# Patient Record
Sex: MALE | Race: WHITE | NOT HISPANIC OR LATINO | Employment: FULL TIME | URBAN - METROPOLITAN AREA
[De-identification: names, ages, dates, MRNs, and addresses within clinical notes are randomized per-mention and may not be internally consistent; named-entity substitution may affect disease eponyms.]

---

## 2020-03-09 ENCOUNTER — OFFICE VISIT (OUTPATIENT)
Dept: FAMILY MEDICINE | Facility: CLINIC | Age: 30
End: 2020-03-09
Payer: COMMERCIAL

## 2020-03-09 VITALS
OXYGEN SATURATION: 96 % | DIASTOLIC BLOOD PRESSURE: 74 MMHG | WEIGHT: 171.6 LBS | BODY MASS INDEX: 32.4 KG/M2 | SYSTOLIC BLOOD PRESSURE: 121 MMHG | RESPIRATION RATE: 18 BRPM | TEMPERATURE: 98.6 F | HEART RATE: 50 BPM | HEIGHT: 61 IN

## 2020-03-09 DIAGNOSIS — H01.136 EYELID DERMATITIS, ECZEMATOUS, LEFT: Primary | ICD-10-CM

## 2020-03-09 PROCEDURE — 99203 OFFICE O/P NEW LOW 30 MIN: CPT | Performed by: FAMILY MEDICINE

## 2020-03-09 RX ORDER — TRIAMCINOLONE ACETONIDE 1 MG/G
CREAM TOPICAL 3 TIMES DAILY PRN
Qty: 15 G | Refills: 0 | Status: SHIPPED | OUTPATIENT
Start: 2020-03-09 | End: 2021-12-07

## 2020-03-09 ASSESSMENT — PAIN SCALES - GENERAL: PAINLEVEL: NO PAIN (0)

## 2020-03-09 ASSESSMENT — MIFFLIN-ST. JEOR: SCORE: 1587.78

## 2020-03-09 NOTE — PROGRESS NOTES
"Subjective     Lefty Chua is a 30 year old male who presents to clinic today for the following health issues:    HPI   Rash  Onset: 3-4 weeks    Description:   Location: left eye   Character: blotchy, red  Itching (Pruritis): YES    Progression of Symptoms:  Worsening and started at top of lid now moved down to the bottom     Accompanying Signs & Symptoms:  Fever: no   Body aches or joint pain: no   Sore throat symptoms: no   Recent cold symptoms: YES- mild drainage and sneezing.     History:   Previous similar rash: no     Precipitating factors:   Exposure to similar rash: no   New exposures: None   Recent travel: no     Alleviating factors:  Nothing     Therapies Tried and outcome: lotion and benadryl- no relief     SUBJECTIVE:  Here today for the above that started a few weeks ago without any known trigger.  Patient has no skin history no significant medical history to speak of.  Started on his upper left lid and now is moved to the lower left lid.  Mildly itchy but not significantly so.  Does not seem to be spreading beyond the changes noted.  Denies rash anywhere else.  Has had no changes in soaps, lotions, etc. or any known exposures at all.  Feels perfectly fine in all other ways.  No GI symptoms.  No constitutional changes.    Review of systems otherwise negative.  Past medical, family, and social history reviewed and updated in chart.    OBJECTIVE:  /74 (BP Location: Right arm, Patient Position: Sitting, Cuff Size: Adult Large)   Pulse 50   Temp 98.6  F (37  C) (Oral)   Resp 18   Ht 1.543 m (5' 0.75\")   Wt 77.8 kg (171 lb 9.6 oz)   SpO2 96%   BMI 32.69 kg/m    Alert, pleasant, upbeat, and in no apparent discomfort.  Ears normal. Throat and pharynx normal. Neck supple. No adenopathy or masses in the neck or supraclavicular regions. Sinuses non tender.  S1 and S2 normal, no murmurs, clicks, gallops or rubs. Regular rate and rhythm. Chest is clear; no wheezes or rales. No edema or JVD.  Eye " exam is normal - FOUZIA, EOMI, fundi normal, corneas normal, no foreign bodies, visual acuity normal both eyes, no periorbital cellulitis.  Skin -clear throughout with no evidence of seborrhea other than a small patch measuring 4 x 8 mm just below his lateral left eyelid.  The patches nonspecifically erythematous and softly bordered.  No significant scale.  No cervical lymphadenopathy  Past labs reviewed with the patient.     ASSESSMENT / PLAN:  (H01.136) Eyelid dermatitis, eczematous, left  (primary encounter diagnosis)  Comment: At this point we do not have a specific contact that would have started the soft but really no other associated disease process either.  Discussed potential associations but at this point we will treat it topically with some steroids and hope for full resolution.  If continues I would send him to dermatology  Plan: triamcinolone (KENALOG) 0.1 % external cream            Follow up contact me in a week  S. Sukumar Page MD    (Chart documentation completed in part with Dragon voice-recognition software.  Even though reviewed some grammatical, spelling, and word errors may remain.)

## 2021-12-05 NOTE — PROGRESS NOTES
SUBJECTIVE:   CC: Lefty Chua is an 31 year old male who presents for preventative health visit.       Patient has been advised of split billing requirements and indicates understanding: Yes  Healthy Habits:    Getting at least 3 servings of Calcium per day:  NO    Bi-annual eye exam:  Yes    Dental care twice a year:  Yes    Sleep apnea or symptoms of sleep apnea:  None    Diet:  Regular (no restrictions)    Frequency of exercise:  4-5 days/week    Duration of exercise:  45-60 minutes    Taking medications regularly:  Not Applicable    Barriers to taking medications:  Not applicable    Medication side effects:  None    PHQ-2 Total Score:    Additional concerns today:  No      Today's PHQ-2 Score:   PHQ-2 ( 1999 Pfizer) 12/7/2021   Q1: Little interest or pleasure in doing things 0   Q2: Feeling down, depressed or hopeless 0   PHQ-2 Score 0   PHQ-2 Total Score (12-17 Years)- Positive if 3 or more points; Administer PHQ-A if positive -       Abuse: Current or Past(Physical, Sexual or Emotional)- No  Do you feel safe in your environment? Yes    Have you ever done Advance Care Planning? (For example, a Health Directive, POLST, or a discussion with a medical provider or your loved ones about your wishes):     Social History     Tobacco Use     Smoking status: Former Smoker     Quit date: 3/19/2018     Years since quitting: 3.7     Smokeless tobacco: Never Used   Substance Use Topics     Alcohol use: Yes     Comment: minimal     If you drink alcohol do you typically have >3 drinks per day or >7 drinks per week? No    Alcohol Use 12/7/2021   Prescreen: >3 drinks/day or >7 drinks/week? No   No flowsheet data found.    Last PSA: No results found for: PSA    Reviewed orders with patient. Reviewed health maintenance and updated orders accordingly - Yes  Lab work is in process  Labs reviewed in EPIC  BP Readings from Last 3 Encounters:   12/07/21 113/71   03/09/20 121/74    Wt Readings from Last 3 Encounters:   12/07/21  76 kg (167 lb 8 oz)   03/09/20 77.8 kg (171 lb 9.6 oz)                    Reviewed and updated as needed this visit by clinical staff  Tobacco  Allergies  Meds  Problems  Med Hx  Surg Hx  Fam Hx  Soc Hx         Reviewed and updated as needed this visit by Provider  Tobacco  Allergies  Meds  Problems  Med Hx  Surg Hx  Fam Hx        Here today for routine checkup.  But also wants to discuss ongoing issues with left shoulder.  10+ years ago had a posterior dislocation of his left shoulder that necessitated an ambulance to the ER and reduction.  At the time he did not have insurance and did not do any type of follow-up MRI, etc.  Has worked hard to keep things stable since then.  But playing basketball a couple of weeks ago he was hit and his left shoulder seem to pop out posteriorly yet again but when he hit the ground it popped back into place.  Has been sore and a bit stiff since then but working on range of motion.    Review of Systems  CONSTITUTIONAL: NEGATIVE for fever, chills, change in weight  INTEGUMENTARY/SKIN: NEGATIVE for worrisome rashes, moles or lesions  EYES: NEGATIVE for vision changes or irritation  ENT: NEGATIVE for ear, mouth and throat problems  RESP: NEGATIVE for significant cough or SOB  CV: NEGATIVE for chest pain, palpitations or peripheral edema  GI: NEGATIVE for nausea, abdominal pain, heartburn, or change in bowel habits   male: negative for dysuria, hematuria, decreased urinary stream, erectile dysfunction, urethral discharge  MUSCULOSKELETAL: As above  NEURO: NEGATIVE for weakness, dizziness or paresthesias  PSYCHIATRIC: NEGATIVE for changes in mood or affect    OBJECTIVE:   /71 (BP Location: Right arm, Patient Position: Sitting, Cuff Size: Adult Regular)   Pulse (!) 47   Temp 98.5  F (36.9  C) (Oral)   Resp 18   Wt 76 kg (167 lb 8 oz)   SpO2 99%   BMI 31.91 kg/m      Physical Exam  GENERAL: healthy, alert and no distress  EYES: Eyes grossly normal to inspection,  PERRL and conjunctivae and sclerae normal  HENT: ear canals and TM's normal, nose and mouth without ulcers or lesions  NECK: no adenopathy, no asymmetry, masses, or scars and thyroid normal to palpation  RESP: lungs clear to auscultation - no rales, rhonchi or wheezes  CV: regular rate and rhythm, normal S1 S2, no S3 or S4, no murmur, click or rub, no peripheral edema and peripheral pulses strong  ABDOMEN: soft, nontender, no hepatosplenomegaly, no masses and bowel sounds normal  MS: no gross musculoskeletal defects noted, no edema  SKIN: no suspicious lesions or rashes  NEURO: Normal strength and tone, mentation intact and speech normal  PSYCH: mentation appears normal, affect normal/bright    Diagnostic Test Results:  Labs reviewed in Epic    ASSESSMENT/PLAN:   (Z00.00) Routine general medical examination at a health care facility  (primary encounter diagnosis)  Comment: Discussed routine health maintenance at his age.  Declines Covid vaccination.  Plan:     (Z87.39) History of closed shoulder dislocation  Comment: We had a lengthy discussion about the anatomy of the shoulder and what happens during dislocation.  In his case now that there is a second dislocation there is certainly a possibility of labral disruption and that has future implications for exercise, activity, etc.  So the next step is to set up imaging to take a look and we will likely need orthopedic follow-up based upon these results.  We discussed that surgery can be an option for some patients but is not always necessary.  Some of this will be determined by what we find on MRI and predicted activity level going forward.  Plan: MR Shoulder Left w/o Contrast, OFFICE/OUTPT         VISIT,OSIRIS WONG IV            (Z13.220) Screening cholesterol level  Comment:   Plan: Lipid panel reflex to direct LDL Non-fasting            (Z13.1) Diabetes mellitus screening  Comment:   Plan: Glucose              Patient has been advised of split billing requirements and  "indicates understanding: Yes  COUNSELING:   Reviewed preventive health counseling, as reflected in patient instructions       Regular exercise       Healthy diet/nutrition       Vision screening    Estimated body mass index is 31.91 kg/m  as calculated from the following:    Height as of 3/9/20: 1.543 m (5' 0.75\").    Weight as of this encounter: 76 kg (167 lb 8 oz).         He reports that he quit smoking about 3 years ago. He has never used smokeless tobacco.      Counseling Resources:  ATP IV Guidelines  Pooled Cohorts Equation Calculator  FRAX Risk Assessment  ICSI Preventive Guidelines  Dietary Guidelines for Americans, 2010  USDA's MyPlate  ASA Prophylaxis  Lung CA Screening    Jeri Page MD  [facility]  "

## 2021-12-07 ENCOUNTER — OFFICE VISIT (OUTPATIENT)
Dept: FAMILY MEDICINE | Facility: CLINIC | Age: 31
End: 2021-12-07
Payer: COMMERCIAL

## 2021-12-07 VITALS
BODY MASS INDEX: 31.91 KG/M2 | DIASTOLIC BLOOD PRESSURE: 71 MMHG | WEIGHT: 167.5 LBS | RESPIRATION RATE: 18 BRPM | TEMPERATURE: 98.5 F | SYSTOLIC BLOOD PRESSURE: 113 MMHG | OXYGEN SATURATION: 99 % | HEART RATE: 47 BPM

## 2021-12-07 DIAGNOSIS — Z13.1 DIABETES MELLITUS SCREENING: ICD-10-CM

## 2021-12-07 DIAGNOSIS — Z87.39 HISTORY OF CLOSED SHOULDER DISLOCATION: ICD-10-CM

## 2021-12-07 DIAGNOSIS — Z00.00 ROUTINE GENERAL MEDICAL EXAMINATION AT A HEALTH CARE FACILITY: Primary | ICD-10-CM

## 2021-12-07 DIAGNOSIS — Z13.220 SCREENING CHOLESTEROL LEVEL: ICD-10-CM

## 2021-12-07 LAB
CHOLEST SERPL-MCNC: 193 MG/DL
FASTING STATUS PATIENT QL REPORTED: NO
FASTING STATUS PATIENT QL REPORTED: NO
GLUCOSE BLD-MCNC: 102 MG/DL (ref 70–99)
HDLC SERPL-MCNC: 58 MG/DL
LDLC SERPL CALC-MCNC: 122 MG/DL
NONHDLC SERPL-MCNC: 135 MG/DL
TRIGL SERPL-MCNC: 66 MG/DL

## 2021-12-07 PROCEDURE — 99395 PREV VISIT EST AGE 18-39: CPT | Performed by: FAMILY MEDICINE

## 2021-12-07 PROCEDURE — 36415 COLL VENOUS BLD VENIPUNCTURE: CPT | Performed by: FAMILY MEDICINE

## 2021-12-07 PROCEDURE — 99213 OFFICE O/P EST LOW 20 MIN: CPT | Mod: 25 | Performed by: FAMILY MEDICINE

## 2021-12-07 PROCEDURE — 80061 LIPID PANEL: CPT | Performed by: FAMILY MEDICINE

## 2021-12-07 PROCEDURE — 82947 ASSAY GLUCOSE BLOOD QUANT: CPT | Performed by: FAMILY MEDICINE

## 2021-12-07 NOTE — LETTER
December 8, 2021      Lefty Chua  [address]  [place] [address]        Lefty,     Your lab work looks great.  Those numbers are actually perfectly normal.       Resulted Orders   Lipid panel reflex to direct LDL Non-fasting   Result Value Ref Range    Cholesterol 193 <200 mg/dL    Triglycerides 66 <150 mg/dL    Direct Measure HDL 58 >=40 mg/dL    LDL Cholesterol Calculated 122 (H) <=100 mg/dL    Non HDL Cholesterol 135 (H) <130 mg/dL    Patient Fasting > 8hrs? No     Narrative    Cholesterol  Desirable:  <200 mg/dL    Triglycerides  Normal:  Less than 150 mg/dL  Borderline High:  150-199 mg/dL  High:  200-499 mg/dL  Very High:  Greater than or equal to 500 mg/dL    Direct Measure HDL  Female:  Greater than or equal to 50 mg/dL   Male:  Greater than or equal to 40 mg/dL    LDL Cholesterol  Desirable:  <100mg/dL  Above Desirable:  100-129 mg/dL   Borderline High:  130-159 mg/dL   High:  160-189 mg/dL   Very High:  >= 190 mg/dL    Non HDL Cholesterol  Desirable:  130 mg/dL  Above Desirable:  130-159 mg/dL  Borderline High:  160-189 mg/dL  High:  190-219 mg/dL  Very High:  Greater than or equal to 220 mg/dL   Glucose   Result Value Ref Range    Glucose 102 (H) 70 - 99 mg/dL    Patient Fasting > 8hrs? No        If you have any questions or concerns, please call the clinic at the number listed above.       Sincerely,      SOLOMON Page M.D.

## 2021-12-08 NOTE — RESULT ENCOUNTER NOTE
Please mail results and note to patient:    Lefty, your lab work looks great.  Those numbers are actually perfectly normal.  SOLOMON Page M.D.

## 2021-12-22 ENCOUNTER — TELEPHONE (OUTPATIENT)
Dept: FAMILY MEDICINE | Facility: CLINIC | Age: 31
End: 2021-12-22

## 2021-12-22 ENCOUNTER — ANCILLARY PROCEDURE (OUTPATIENT)
Dept: MRI IMAGING | Facility: CLINIC | Age: 31
End: 2021-12-22
Attending: FAMILY MEDICINE
Payer: COMMERCIAL

## 2021-12-22 DIAGNOSIS — Z87.39 HISTORY OF CLOSED SHOULDER DISLOCATION: ICD-10-CM

## 2021-12-22 PROCEDURE — 73221 MRI JOINT UPR EXTREM W/O DYE: CPT | Mod: LT | Performed by: RADIOLOGY

## 2021-12-22 NOTE — TELEPHONE ENCOUNTER
Called and spoke with Lefty and read him the providers message below as written. Lefty had no questions during the call.     Alicia Fisher RN BSN

## 2021-12-22 NOTE — TELEPHONE ENCOUNTER
----- Message from Lorraine Hurd sent at 12/22/2021  4:56 PM CST -----    ----- Message -----  From: Jeri Page MD  Sent: 12/22/2021   3:38 PM CST  To: [facility]    Call patient (and send a copy of labs):  His MRI showed that he likely had actually an anterior dislocation, not posterior.  Some gas and that was the most recent.  In any case I think the best course of action is to get him in with orthopedics for their opinion.  It may be that nothing is needed such as surgery, etc.  Let us at least have an expert take a look at the MRI is to be sure.  I will place the order and the schedulers will call him.    SOLOMON Page M.D.

## 2021-12-22 NOTE — RESULT ENCOUNTER NOTE
Call patient (and send a copy of labs):  His MRI showed that he likely had actually an anterior dislocation, not posterior.  Some gas and that was the most recent.  In any case I think the best course of action is to get him in with orthopedics for their opinion.  It may be that nothing is needed such as surgery, etc.  Let us at least have an expert take a look at the MRI is to be sure.  I will place the order and the schedulers will call him.    SOLOMON Page M.D.

## 2021-12-29 NOTE — TELEPHONE ENCOUNTER
Left shoulder instability, MR 12/22/21, ref'd by PCP. History of dislocation years ago and again more recently. Pt will need XR on arrival. Called and left VM to come 30 min early to get these.    Jeferson Ribeiro RN

## 2021-12-30 ENCOUNTER — ANCILLARY PROCEDURE (OUTPATIENT)
Dept: GENERAL RADIOLOGY | Facility: CLINIC | Age: 31
End: 2021-12-30
Attending: ORTHOPAEDIC SURGERY
Payer: COMMERCIAL

## 2021-12-30 ENCOUNTER — PRE VISIT (OUTPATIENT)
Dept: ORTHOPEDICS | Facility: CLINIC | Age: 31
End: 2021-12-30

## 2021-12-30 ENCOUNTER — OFFICE VISIT (OUTPATIENT)
Dept: ORTHOPEDICS | Facility: CLINIC | Age: 31
End: 2021-12-30
Attending: FAMILY MEDICINE
Payer: COMMERCIAL

## 2021-12-30 DIAGNOSIS — Z87.39 HISTORY OF CLOSED SHOULDER DISLOCATION: ICD-10-CM

## 2021-12-30 DIAGNOSIS — Z87.39 HISTORY OF CLOSED SHOULDER DISLOCATION: Primary | ICD-10-CM

## 2021-12-30 PROCEDURE — 99203 OFFICE O/P NEW LOW 30 MIN: CPT | Performed by: ORTHOPAEDIC SURGERY

## 2021-12-30 PROCEDURE — 73030 X-RAY EXAM OF SHOULDER: CPT | Mod: LT | Performed by: RADIOLOGY

## 2021-12-30 NOTE — NURSING NOTE
Reason For Visit:   Chief Complaint   Patient presents with     Consult     Left shoulder instability after 2 dislocations, MR 12/22/21, ref'd by PCP       PCP: Jeri Page  Ref: Maggie Page MD    ?  No  Occupation:   Currently working? Yes.  Work status?  Full time.  Date of injury: Dislocated left shoulder 10 years ago, and then again in November 2021  Type of injury: Dislocation followed by chronic pain/immobilization.  Smoker: No  Request smoking cessation information: No    Right hand dominant    SANE score  Affected shoulder: Left  Right shoulder SANE: 100  Left shoulder SANE: 75    There were no vitals taken for this visit.           Nikolai Rangel, EMT

## 2021-12-30 NOTE — LETTER
12/30/2021         RE: Lefty Chua  [address]  [place] [facility] [id number]        Dear Colleague,    Thank you for referring your patient, Lefty Chua, to the [facility]. Please see a copy of my visit note below.    CHIEF CONCERN:  Left shoulder dislocations    HISTORY OF PRESENT ILLNESS: Mr. Wilcox is a 31-year-old right-hand-dominant gentleman who I am seeing today for history of recurrent shoulder instability.  He states that he had his first dislocation perhaps 10 years ago.  He had what he describes as a pretty bad injury and for 1 week after he continue to monitor the shoulder although it was immobile.  Because he was uninsured he did not seek medical care.  A week after the original injury [place] the airport and [place] a bag over his shoulder and frankly dislocated it.  He went to the emergency department where it was reduced under sedation.  After that time he did have a loss of motion in abduction external rotation but he was okay with most things.  He notes that he appreciated that his shoulder was a little weak and over shoulder press activities.  In mid November of this year he was playing basketball at [employer] and sustained a dislocation at that time.  He reduced that himself and notes that now he has no sense of instability since the event.  He does have a dull ache.  He has returned to lifting light weights and running some.  His family wanted him to seek medical care and thus he did with primary clinic.  An MRI was obtained and he was referred here.    No past medical history on file.    Past Surgical History:   Procedure Laterality Date     TONSILLECTOMY         No current outpatient medications on file.        No Known Allergies    SOCIAL HISTORY:    Social History     Socioeconomic History     Marital status: Single     Spouse name: Not on file     Number of children: Not on file     Years of education: Not on file     Highest education level: Not on file    Occupational History     Not on file   Tobacco Use     Smoking status: Former Smoker     Quit date: 3/19/2018     Years since quitting: 3.8     Smokeless tobacco: Never Used   Vaping Use     Vaping Use: Never used   Substance and Sexual Activity     Alcohol use: Yes     Comment: minimal     Drug use: Never     Sexual activity: Yes   Other Topics Concern     Not on file   Social History Narrative     Not on file     Social Determinants of Health     Financial Resource Strain: Not on file   Food Insecurity: Not on file   Transportation Needs: Not on file   Physical Activity: Not on file   Stress: Not on file   Social Connections: Not on file   Intimate Partner Violence: Not on file   Housing Stability: Not on file       FAMILY HISTORY: Reviewed in EMR      REVIEW OF SYSTEMS: Positive for that noted in past medical history and history of present illness and otherwise reviewed in EMR     PHYSICAL EXAM:    Adult male in no acute distress. Articulates and communicates with normal affect.  Respirations even and unlabored  Focused upper extremity exam: Skin intact. No erythema. Sensation intact all dermatomes into the hand to light touch. EPL, FPL, and Intrinsics intact. Right shoulder active motion is FE to 175, ER at side to 80, and IR to T10. Left shoulder active motion is FE to 175, ER to 75, and IR to T10.  Negative Neer and Vidales. No pain on palpation over the AC joint. No pain on palpation over the long head of the biceps. FE and ER strength 5/5.    IMAGING:  Left shoulder XR dated today was reviewed and I agree with the Impression below:  Impression:  1. Cortical irregularity about the inferior glenoid consistent this  osseous Bankart lesion.  2. With very subtle contour abnormality of the posterior aspect of the  humeral head noted on the axillary view, indicative of Hill-Sachs  lesion.    Left shoulder MRI dated 12/22/21 was reviewed and I agree with the Impression below:  Impression:  1. Constellation of  imaging findings consistent with sequelae of  anterior shoulder dislocation.   a. Hill-Sachs lesion.   b. 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   c. Additional labral tearing posteriorly and inferiorly.    ASSESSMENT:    1. Left shoulder instability events    PLAN:    I reviewed the imaging and history with the patient and we discussed the nonoperative and operative treatment options for recurrent instability.  I discussed that the risk of recurrent instability is higher in the setting of a bony Bankart lesion with nonoperative treatment compared to operative management.  The patient however would very much like to avoid surgery given concern for time off work and cost.  He would prefer to initiate formal physical therapy and as he has not done this in the past I think it is not unreasonable.  We will assist him in securing care with physical therapy for shoulder stabilization program.  He will return as needed and I emphasized that if he has any sense of subjective instability even without a mohini subluxation or dislocation I would ask him to notify us so that we may discuss his shoulder progress.    Kristin Duran MD        Again, thank you for allowing me to participate in the care of your patient.        Sincerely,        Kristin Duran MD

## 2022-01-06 ENCOUNTER — THERAPY VISIT (OUTPATIENT)
Dept: PHYSICAL THERAPY | Facility: CLINIC | Age: 32
End: 2022-01-06
Attending: ORTHOPAEDIC SURGERY
Payer: COMMERCIAL

## 2022-01-06 DIAGNOSIS — Z87.39 HISTORY OF CLOSED SHOULDER DISLOCATION: ICD-10-CM

## 2022-01-06 DIAGNOSIS — M25.512 ACUTE PAIN OF LEFT SHOULDER: ICD-10-CM

## 2022-01-06 PROCEDURE — 97161 PT EVAL LOW COMPLEX 20 MIN: CPT | Mod: GP | Performed by: PHYSICAL THERAPIST

## 2022-01-06 PROCEDURE — 97110 THERAPEUTIC EXERCISES: CPT | Mod: GP | Performed by: PHYSICAL THERAPIST

## 2022-01-06 NOTE — PROGRESS NOTES
Physical Therapy Initial Evaluation  Subjective:    Patient Health History  Lefty Chua being seen for Physical Therapy / Dislocated Shoulder / Gage Bankart.     Problem began: 11/12/2021.   Problem occurred: Playing Basketball   Pain is reported as 1/10 on pain scale.  General health as reported by patient is excellent.  Pertinent medical history includes: smoking.     Medical allergies: none.   Surgeries include:  None.    Current medications:  None.    Current occupation is .   Primary job tasks include:  Computer work, driving, lifting/carrying, prolonged sitting, prolonged standing and pushing/pulling.                  Therapist Generated HPI Evaluation  Problem details: Left shoulder pain after dislocation with playing basketball. Still lifting 3-4 x/ day / week. But avoiding overhead lifting. 11/19/21.         Type of problem:  Left shoulder.    Chronicity: 11/19/21.  Occurance: playing sports.  Where condition occurred: during recreation/sport.  Patient reports pain:  Posterior and lateral.  Pain is described as aching and is intermittent.  Pain is the same all the time.  Since onset symptoms are gradually improving.  Associated symptoms:  Loss of motion/stiffness, painful arc and dislocating/subluxing. Symptoms are exacerbated by lifting (sleeping on it, reaching back k, reaching across his body)  Relieved by: stretching.  Special tests included:  X-ray.    Restrictions due to condition include:  Working in normal job without restrictions.  Barriers include:  None as reported by patient.                        Objective:  System                   Shoulder Evaluation:  ROM:  AROM:    Flexion:  Left:  162 with ERP    Right:  165  Extension: Left: 45Right: 62  Abduction:  Left: 172   Right:  172      External Rotation:  Left:  52    Right:  58            Extension/Internal Rotation:  Left:  T6    Right:  T7          Strength:    Flexion: Left:4/5 Weak/painful    Pain:    Right:  5/5  Strong/pain free     Pain:   Extension:  Left: 4/5  Weak/pain free    Pain:    Right: 5/5    Strong/pain free  Pain:  Abduction:  Left: 5/5  Strong/pain free  Pain:    Right: 5/5   Strong/pain free    Pain:  Adduction:  Left: 5/5   Strong/pain free   Pain:    Right: 5/5   Strong/pain free    Pain:  Internal Rotation:  Left:5/5   Strong/pain free    Pain:    Right: 5/5   Strong/pain free    Pain:  External Rotation:   Left:5/5   Strong/pain free    Pain:   Right:5/5   Strong/pain free    Pain:    Horizontal Abduction:  Left:5/5   Strong/pain free    Pain:    Right:5/5   Strong/pain free   Pain:  Horizontal Adduction:  Left:5/5   Strong/pain free    Pain:    Right:5/5   Strong/pain free    Pain:  Elbow Flexion:  Left:5/5   Strong/pain free    Pain:    Right:5/5   Strong/pain free    Pain:  Elbow Extension:  Left:5/5   Strong/pain free    Pain:    Right:5/5   Strong/pain free    Pain:  Stability Testing:    Left shoulder stability positive testing:  Apprehension and External Rotation  Left shoulder stability negative testing:  Internal Rotation      Palpation:    Left shoulder tenderness present at:  Bicipital Groove  Left shoulder tenderness not present at: Supraspinatus; Infraspinatus; Subscapularis; Deltoid; Levator; Rhomboids or Upper Trap    Mobility Tests:  normal                                                 General     ROS    Assessment/Plan:    Patient is a 31 year old male with left side shoulder complaints.    Patient has the following significant findings with corresponding treatment plan.                Diagnosis 1:  Left shoulder pain s/p dislocation bony bankart  Pain -  hot/cold therapy, self management, education and home program  Decreased ROM/flexibility - manual therapy, therapeutic exercise, therapeutic activity and home program  Decreased strength - therapeutic exercise, therapeutic activities and home program  Decreased function - therapeutic activities and home program    Therapy  Evaluation Codes:   1) History comprised of:   Personal factors that impact the plan of care:      None.    Comorbidity factors that impact the plan of care are:      None.     Medications impacting care: None.  2) Examination of Body Systems comprised of:   Body structures and functions that impact the plan of care:      Shoulder.   Activity limitations that impact the plan of care are:      Lifting.  3) Clinical presentation characteristics are:   Stable/Uncomplicated.  4) Decision-Making    Low complexity using standardized patient assessment instrument and/or measureable assessment of functional outcome.  Cumulative Therapy Evaluation is: Low complexity.    Previous and current functional limitations:  (See Goal Flow Sheet for this information)    Short term and Long term goals: (See Goal Flow Sheet for this information)     Communication ability:  Patient appears to be able to clearly communicate and understand verbal and written communication and follow directions correctly.  Treatment Explanation - The following has been discussed with the patient:   RX ordered/plan of care  Anticipated outcomes  Possible risks and side effects  This patient would benefit from PT intervention to resume normal activities.   Rehab potential is excellent.    Frequency:  1 X a month, once daily  Duration:  for 2 months  Discharge Plan:  Achieve all LTG.  Independent in home treatment program.  Reach maximal therapeutic benefit.    Please refer to the daily flowsheet for treatment today, total treatment time and time spent performing 1:1 timed codes.

## 2022-01-08 NOTE — PROGRESS NOTES
CHIEF CONCERN:  Left shoulder dislocations    HISTORY OF PRESENT ILLNESS: Mr. Wilcox is a 31-year-old right-hand-dominant gentleman who I am seeing today for history of recurrent shoulder instability.  He states that he had his first dislocation perhaps 10 years ago.  He had what he describes as a pretty bad injury and for 1 week after he continue to monitor the shoulder although it was immobile.  Because he was uninsured he did not seek medical care.  A week after the original injury [place] the airport and [place] a bag over his shoulder and frankly dislocated it.  He went to the emergency department where it was reduced under sedation.  After that time he did have a loss of motion in abduction external rotation but he was okay with most things.  He notes that he appreciated that his shoulder was a little weak and over shoulder press activities.  In mid November of this year he was playing basketball at [employer] and sustained a dislocation at that time.  He reduced that himself and notes that now he has no sense of instability since the event.  He does have a dull ache.  He has returned to lifting light weights and running some.  His family wanted him to seek medical care and thus he did with primary clinic.  An MRI was obtained and he was referred here.    No past medical history on file.    Past Surgical History:   Procedure Laterality Date     TONSILLECTOMY         No current outpatient medications on file.        No Known Allergies    SOCIAL HISTORY:    Social History     Socioeconomic History     Marital status: Single     Spouse name: Not on file     Number of children: Not on file     Years of education: Not on file     Highest education level: Not on file   Occupational History     Not on file   Tobacco Use     Smoking status: Former Smoker     Quit date: 3/19/2018     Years since quitting: 3.8     Smokeless tobacco: Never Used   Vaping Use     Vaping Use: Never used   Substance and Sexual Activity      Alcohol use: Yes     Comment: minimal     Drug use: Never     Sexual activity: Yes   Other Topics Concern     Not on file   Social History Narrative     Not on file     Social Determinants of Health     Financial Resource Strain: Not on file   Food Insecurity: Not on file   Transportation Needs: Not on file   Physical Activity: Not on file   Stress: Not on file   Social Connections: Not on file   Intimate Partner Violence: Not on file   Housing Stability: Not on file       FAMILY HISTORY: Reviewed in EMR      REVIEW OF SYSTEMS: Positive for that noted in past medical history and history of present illness and otherwise reviewed in EMR     PHYSICAL EXAM:    Adult male in no acute distress. Articulates and communicates with normal affect.  Respirations even and unlabored  Focused upper extremity exam: Skin intact. No erythema. Sensation intact all dermatomes into the hand to light touch. EPL, FPL, and Intrinsics intact. Right shoulder active motion is FE to 175, ER at side to 80, and IR to T10. Left shoulder active motion is FE to 175, ER to 75, and IR to T10.  Negative Neer and Vidales. No pain on palpation over the AC joint. No pain on palpation over the long head of the biceps. FE and ER strength 5/5.    IMAGING:  Left shoulder XR dated today was reviewed and I agree with the Impression below:  Impression:  1. Cortical irregularity about the inferior glenoid consistent this  osseous Bankart lesion.  2. With very subtle contour abnormality of the posterior aspect of the  humeral head noted on the axillary view, indicative of Hill-Sachs  lesion.    Left shoulder MRI dated 12/22/21 was reviewed and I agree with the Impression below:  Impression:  1. Constellation of imaging findings consistent with sequelae of  anterior shoulder dislocation.   a. Hill-Sachs lesion.   b. 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   c. Additional labral tearing posteriorly and inferiorly.    ASSESSMENT:    1.  Left shoulder instability events    PLAN:    I reviewed the imaging and history with the patient and we discussed the nonoperative and operative treatment options for recurrent instability.  I discussed that the risk of recurrent instability is higher in the setting of a bony Bankart lesion with nonoperative treatment compared to operative management.  The patient however would very much like to avoid surgery given concern for time off work and cost.  He would prefer to initiate formal physical therapy and as he has not done this in the past I think it is not unreasonable.  We will assist him in securing care with physical therapy for shoulder stabilization program.  He will return as needed and I emphasized that if he has any sense of subjective instability even without a mohini subluxation or dislocation I would ask him to notify us so that we may discuss his shoulder progress.    Kristin Duran MD

## 2022-01-29 ENCOUNTER — HEALTH MAINTENANCE LETTER (OUTPATIENT)
Age: 32
End: 2022-01-29

## 2022-02-10 PROBLEM — M25.512 ACUTE PAIN OF LEFT SHOULDER: Status: RESOLVED | Noted: 2022-01-06 | Resolved: 2022-02-10

## 2022-09-18 ENCOUNTER — HEALTH MAINTENANCE LETTER (OUTPATIENT)
Age: 32
End: 2022-09-18

## 2022-12-09 ENCOUNTER — OFFICE VISIT (OUTPATIENT)
Dept: FAMILY MEDICINE | Facility: CLINIC | Age: 32
End: 2022-12-09
Payer: COMMERCIAL

## 2022-12-09 VITALS
RESPIRATION RATE: 20 BRPM | HEART RATE: 65 BPM | SYSTOLIC BLOOD PRESSURE: 118 MMHG | OXYGEN SATURATION: 97 % | HEIGHT: 69 IN | DIASTOLIC BLOOD PRESSURE: 68 MMHG | WEIGHT: 166 LBS | BODY MASS INDEX: 24.59 KG/M2 | TEMPERATURE: 97.5 F

## 2022-12-09 DIAGNOSIS — Z13.220 SCREENING FOR LIPID DISORDERS: ICD-10-CM

## 2022-12-09 DIAGNOSIS — Z30.09 VASECTOMY EVALUATION: ICD-10-CM

## 2022-12-09 DIAGNOSIS — Z00.00 ANNUAL PHYSICAL EXAM: Primary | ICD-10-CM

## 2022-12-09 DIAGNOSIS — L20.84 INTRINSIC ECZEMA: ICD-10-CM

## 2022-12-09 DIAGNOSIS — Z13.1 SCREENING FOR DIABETES MELLITUS: ICD-10-CM

## 2022-12-09 PROBLEM — Z87.39 HISTORY OF CLOSED SHOULDER DISLOCATION: Status: RESOLVED | Noted: 2021-12-07 | Resolved: 2022-12-09

## 2022-12-09 LAB
CHOLEST SERPL-MCNC: 229 MG/DL
FASTING STATUS PATIENT QL REPORTED: YES
FASTING STATUS PATIENT QL REPORTED: YES
GLUCOSE BLD-MCNC: 89 MG/DL (ref 70–99)
HDLC SERPL-MCNC: 60 MG/DL
LDLC SERPL CALC-MCNC: 157 MG/DL
NONHDLC SERPL-MCNC: 169 MG/DL
TRIGL SERPL-MCNC: 58 MG/DL

## 2022-12-09 PROCEDURE — 99395 PREV VISIT EST AGE 18-39: CPT | Performed by: INTERNAL MEDICINE

## 2022-12-09 PROCEDURE — 82947 ASSAY GLUCOSE BLOOD QUANT: CPT | Performed by: INTERNAL MEDICINE

## 2022-12-09 PROCEDURE — 80061 LIPID PANEL: CPT | Performed by: INTERNAL MEDICINE

## 2022-12-09 PROCEDURE — 36415 COLL VENOUS BLD VENIPUNCTURE: CPT | Performed by: INTERNAL MEDICINE

## 2022-12-09 RX ORDER — BETAMETHASONE DIPROPIONATE 0.5 MG/G
LOTION TOPICAL 2 TIMES DAILY
Qty: 30 ML | Refills: 1 | Status: SHIPPED | OUTPATIENT
Start: 2022-12-09

## 2022-12-09 RX ORDER — BETAMETHASONE DIPROPIONATE 0.5 MG/G
LOTION TOPICAL 2 TIMES DAILY
COMMUNITY
End: 2022-12-09

## 2022-12-09 ASSESSMENT — ENCOUNTER SYMPTOMS
EYE PAIN: 0
DIARRHEA: 0
COUGH: 0
NAUSEA: 0
DYSURIA: 0
HEARTBURN: 0
PALPITATIONS: 0
FREQUENCY: 0
HEMATURIA: 0
FEVER: 0
HEADACHES: 0
JOINT SWELLING: 0
CHILLS: 0
ARTHRALGIAS: 0
ABDOMINAL PAIN: 0
CONSTIPATION: 0
HEMATOCHEZIA: 0
NERVOUS/ANXIOUS: 0
DIZZINESS: 0
SORE THROAT: 0
WEAKNESS: 0
MYALGIAS: 0
PARESTHESIAS: 0
SHORTNESS OF BREATH: 0

## 2022-12-09 ASSESSMENT — PAIN SCALES - GENERAL: PAINLEVEL: NO PAIN (0)

## 2022-12-09 NOTE — PROGRESS NOTES
SUBJECTIVE:   CC: Lefty is an 32 year old who presents for preventative health visit.     28-year-old gentleman comes for annual physical examination.  He offers no concerns.  He would like to be referred for vasectomy.  He exercises regularly and does not smoke.   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      Patient has been advised of split billing requirements and indicates understanding: Yes  Healthy Habits:     Getting at least 3 servings of Calcium per day:  Yes    Bi-annual eye exam:  Yes    Dental care twice a year:  Yes    Sleep apnea or symptoms of sleep apnea:  None    Diet:  Regular (no restrictions)    Frequency of exercise:  4-5 days/week    Duration of exercise:  45-60 minutes    Taking medications regularly:  Yes    Medication side effects:  Not applicable    PHQ-2 Total Score: 0    Additional concerns today:  No      Today's PHQ-2 Score:   PHQ-2 (  [employer]) 2022   Q1: Little interest or pleasure in doing things 0   Q2: Feeling down, depressed or hopeless 0   PHQ-2 Score 0   PHQ-2 Total Score (12-17 Years)- Positive if 3 or more points; Administer PHQ-A if positive -   Q1: Little interest or pleasure in doing things Not at all   Q2: Feeling down, depressed or hopeless Not at all   PHQ-2 Score 0       Have you ever done Advance Care Planning? (For example, a Health Directive, POLST, or a discussion with a medical provider or your loved ones about your wishes): No, advance care planning information given to patient to review.  Advanced care planning was discussed at today's visit.    Social History     Tobacco Use     Smoking status: Former     Packs/day: 1.00     Types: Cigarettes     Start date:      Quit date: 3/19/2018     Years since quittin.7     Smokeless tobacco: Never   Substance Use Topics     Alcohol use: Yes     Comment: minimal         Alcohol Use 2022   Prescreen: >3 drinks/day or >7 drinks/week? No   Prescreen: >3 drinks/day or >7  drinks/week? -       Last PSA: No results found for: PSA    Reviewed orders with patient. Reviewed health maintenance and updated orders accordingly - Yes  BP Readings from Last 3 Encounters:   22 118/68   21 113/71   20 121/74    Wt Readings from Last 3 Encounters:   22 75.3 kg (166 lb)   21 76 kg (167 lb 8 oz)   20 77.8 kg (171 lb 9.6 oz)                  Patient Active Problem List   Diagnosis     Intrinsic eczema     Past Surgical History:   Procedure Laterality Date     TONSILLECTOMY         Social History     Tobacco Use     Smoking status: Former     Packs/day: 1.00     Types: Cigarettes     Start date:      Quit date: 3/19/2018     Years since quittin.7     Smokeless tobacco: Never   Substance Use Topics     Alcohol use: Yes     Comment: minimal     Family History   Problem Relation Age of Onset     Alcoholism Father      Diabetes Maternal Grandfather      Alcoholism Maternal Grandfather          Current Outpatient Medications   Medication Sig Dispense Refill     betamethasone dipropionate (DIPROSONE) 0.05 % external lotion Apply topically 2 times daily 30 mL 1     No Known Allergies  Recent Labs   Lab Test 21  1612   *   HDL 58   TRIG 66        Reviewed and updated as needed this visit by clinical staff     Meds              Reviewed and updated as needed this visit by Provider                 Past Medical History:   Diagnosis Date     History of closed shoulder dislocation 2021    Left  - posterior x2     Intrinsic eczema 2022      Past Surgical History:   Procedure Laterality Date     TONSILLECTOMY         Review of Systems   Constitutional: Negative for chills and fever.   HENT: Negative for congestion, ear pain, hearing loss and sore throat.    Eyes: Negative for pain and visual disturbance.   Respiratory: Negative for cough and shortness of breath.    Cardiovascular: Negative for chest pain, palpitations and peripheral edema.  "  Gastrointestinal: Negative for abdominal pain, constipation, diarrhea, heartburn, hematochezia and nausea.   Genitourinary: Negative for dysuria, frequency, genital sores, hematuria, impotence, penile discharge and urgency.   Musculoskeletal: Negative for arthralgias, joint swelling and myalgias.   Skin: Negative for rash.   Neurological: Negative for dizziness, weakness, headaches and paresthesias.   Psychiatric/Behavioral: Negative for mood changes. The patient is not nervous/anxious.          OBJECTIVE:   /68   Pulse 65   Temp 97.5  F (36.4  C) (Tympanic)   Resp 20   Ht 1.753 m (5' 9\")   Wt 75.3 kg (166 lb)   SpO2 97%   BMI 24.51 kg/m      Physical Exam  GENERAL: healthy, alert and no distress  EYES: Eyes grossly normal to inspection, PERRL and conjunctivae and sclerae normal  HENT: ear canals and TM's normal, nose and mouth without ulcers or lesions  NECK: no adenopathy, no asymmetry, masses, or scars and thyroid normal to palpation  RESP: lungs clear to auscultation - no rales, rhonchi or wheezes  CV: regular rate and rhythm, normal S1 S2, no S3 or S4, no murmur, click or rub, no peripheral edema and peripheral pulses strong  ABDOMEN: soft, nontender, no hepatosplenomegaly, no masses and bowel sounds normal   (male): normal male genitalia without lesions or urethral discharge, no hernia  MS: no gross musculoskeletal defects noted, no edema  SKIN: no suspicious lesions or rashes  NEURO: Normal strength and tone, mentation intact and speech normal  PSYCH: mentation appears normal, affect normal/bright  LYMPH: no cervical, supraclavicular, axillary, or inguinal adenopathy    Diagnostic Test Results:  Labs reviewed in Epic    ASSESSMENT/PLAN:     1.  Annual physical examination completed and is good.  2.  Vasectomy evaluation.  Refer to urology for vasectomy.  3.  Intrinsic eczema for which he uses betamethasone external lotion on a as needed basis.  Requested a refill which I provided.  4.  " Screening for dyslipidemia and diabetes.  I will get back to the results.      Patient has been advised of split billing requirements and indicates understanding: Yes      COUNSELING:   Reviewed preventive health counseling, as reflected in patient instructions       Regular exercise       Healthy diet/nutrition       Vision screening        He reports that he quit smoking about 4 years ago. His smoking use included cigarettes. He started smoking about 17 years ago. He smoked an average of 1 pack per day. He has never used smokeless tobacco.        Elian Phillips MD  [facility]

## 2023-03-09 ENCOUNTER — OFFICE VISIT (OUTPATIENT)
Dept: UROLOGY | Facility: CLINIC | Age: 33
End: 2023-03-09
Attending: INTERNAL MEDICINE
Payer: COMMERCIAL

## 2023-03-09 DIAGNOSIS — Z30.09 VASECTOMY EVALUATION: ICD-10-CM

## 2023-03-09 PROCEDURE — 99202 OFFICE O/P NEW SF 15 MIN: CPT | Performed by: UROLOGY

## 2023-03-09 NOTE — NURSING NOTE
Lefty Chua's chief complaint for this visit includes:  Chief Complaint   Patient presents with     New Patient     vasectomy consult scheduled with patient     PCP: Elian Phillips    Vasectomy    Understanding Vasectomy  Vasectomy is a simple, safe procedure that makes a man sterile (unable to father a child). It is the most effective birth control method for men.    Your Reproductive System  For pregnancy to occur, a man s sperm (male reproductive cells) must join with a woman s egg. To understand how a vasectomy works, you need to know how sperm are produced, stored, and released by the body.  The urethra is the tube in the center of the penis. It transports both urine and semen. When you have an orgasm, semen is ejaculated out of the urethra.  The seminal vesicles and the prostate gland secrete fluid called semen.  This sticky, white fluid helps nourish sperm and carry them along.  The epididymis is a coiled tube that holds the sperm while they mature.  The scrotum is a pouch of skin that contains the testes.  The testes are glands that produce sperm and male hormones.  The vas deferens is the tubes that carry the sperm from the epididymis to the penis.  Sperm carry genetic material.    Risks and Complications  A vasectomy is an outpatient (same day) procedure. It will be done in the clinic or in the same day surgery center. Before your vasectomy will be performed, you ll be asked to read and sign a consent form. This form stated you re aware of the possible risks and complications and understand that the procedure, though usually successful, is not guaranteed to make you sterile. Be sure that you have all your questions answered before signing this form. After the procedure, if you have any of the following or other symptoms you re concerned about, call your doctor.    Possible Risks and Complications  Vasectomy is a safe procedure. But it does have risks, including bleeding and infection.  You may also  have any of the following after surgery.  Sperm granuloma is a small, harmless lump that may form where the vas deferens is sealed off.  Sperm buildup (congestion) may cause soreness in the testes.  Anti-inflammatory medications can provide relief.  Epididymitis is inflammation that may cause scrotal aching. This often goes away without treatment. Occasionally, antibiotics are required.  Anti-inflammatory medications may provide relief.  Reconnection of the vas deferens can occur in rare cases. This makes you fertile again and can result in an unwanted pregnancy.  Sperm antibodies are a common response of the body to absorbed sperm. The antibodies can make you sterile, even if you later try to reverse your vasectomy.  Long term testicular discomfort may occur after surgery, but is very rare.    Vasectomy Preparation  Shave all hair from around the penis and the entire scrotum. You should do this on the day of the vasectomy: 2-4 hours prior to your appointment. This serves to cut down on the risk of infection. You may lather the scrotum with soap and water and shave with a disposable blade razor. Do not use an electric razor or depilatory hair creams.  After shaving the area, shower or bathe to remove all loose hairs.  Bring a scrotal support or tight cotton shorts with you on the day of your procedure.  Bring headphones/music if you would like to use during the procedure.  You may drive yourself to this procedure, only a local anesthetic is used.    During the Procedure  You will be asked to undress from the waist down and lie on the exam table. Anticeptic solution will be applied to the scrotal areas. Sterile drapes are placed over you to help prevent infection. You will be given a local anesthetic into your scrotum to prevent you from feeling pain. Once the anesthetic takes effect, the doctor makes one puncture in each side of the scrotum with a pointed clamp. Each of the two vasa deferentia are lifted through  this puncture site. The vasa are cut, and a section is removed. You may feel a pulling sensation during this process. The two cut ends are sealed by heat (cauterized) and also tied. The puncture is then sutured with a stitch.    After the Procedure  The local anesthetic begins to wear off after an hour or so. Any discomfort you feel is usually very mild. If you need it, pain reliever may help.    During Your Healing  Recovery time after a no-scalpel vasectomy is usually less than after a traditional vasectomy. For about a week, your scrotum may look bruised and slightly swollen.  You may also have a small amount of bloody discharge from the incision. This is normal.    To help make your recovery more comfortable, follow the tips below.  Stay off your feet as much as possible for the first few days to lessen the chance of swelling. Try to lie flat on a bed or sofa.  Reduce swelling by placing an ice pack or bag of frozen peas in a thin towel. Then place the towel on your scrotum.  Wear snug cotton briefs or an athletic supporter.  Take medications with acetaminophen (such as Tylenol) to relieve any discomfort.   Don t use aspirin, ibuprofen or naproxen.  Your doctor may give you a pain pill prescription.  Wait 48 hours before bathing.  Avoid heavy lifting or exercise for at least 10 days.  You can return to work in a day or two after the procedure.  You can begin having sex again two weeks after the procedure.    Note: You must use some form of birth control until your doctor says you re sterile.    Call your doctor if you notice any of the following after surgery:  Increasing pain or swelling in your scrotum  A large black-and-blue area, or a growing lump (some bruising is normal)  Fever or chills  Increasing redness or drainage of the incision    Sex after Vasectomy  Vasectomy doesn t change your sexual function. So when you start having sex again, it should feel the same as before. A vasectomy also shouldn t  affect your relationship with your partner. It s important to remember, though, that you won t be sterile right away. It will take time before you can have sex without the need for birth control.    Until you re sterile: After a vasectomy, some active sperm still remain in your semen. It will take time and many ejaculations before the sperm are completely gone. During this period, you must use another birth control method to prevent pregnancy. To make sure no sperm are left in your semen, you ll need to have at least one semen exam. You usually collect a semen sample at home and bring it to a lab. The sample is then checked under a microscope. You are sterile only when the sample(s) shows no evidence of sperm. Ask your doctor whether additional follow-up is needed.    After you re sterile: After your doctor tells you you re sterile, you no longer need to use any form of birth control. You re free to have sex without the fear of unwanted pregnancy. However, a vasectomy does not protect you from sexually transmitted diseases (STDs). If you have more than one sex partner, be sure to practice safer sex by using condoms.        Referring Provider:  Elian Phillips MD  [address] [place] [address]    There were no vitals taken for this visit.  Data Unavailable      No Known Allergies      Do you need any medication refills at today's visit? No    [facility] Visit Facilitator- Surgical Specialties

## 2023-05-02 ENCOUNTER — OFFICE VISIT (OUTPATIENT)
Dept: UROLOGY | Facility: CLINIC | Age: 33
End: 2023-05-02
Payer: COMMERCIAL

## 2023-05-02 VITALS — SYSTOLIC BLOOD PRESSURE: 120 MMHG | HEART RATE: 71 BPM | DIASTOLIC BLOOD PRESSURE: 69 MMHG

## 2023-05-02 DIAGNOSIS — Z30.2 ENCOUNTER FOR STERILIZATION: Primary | ICD-10-CM

## 2023-05-02 PROCEDURE — 55250 REMOVAL OF SPERM DUCT(S): CPT | Performed by: UROLOGY

## 2023-05-02 NOTE — NURSING NOTE
Lefty Chua's goals for this visit include:   Chief Complaint   Patient presents with     Vasectomy     Voluntary sterilization        He requests these members of his care team be copied on today's visit information:       PCP: Elian Phillips    Referring Provider:  No referring provider defined for this encounter.    /69   Pulse 71     Do you need any medication refills at today's visit?     Tasneem Baker LPN on 5/2/2023 at 7:49 AM

## 2023-05-02 NOTE — PATIENT INSTRUCTIONS
Vasectomy Post-Op Care Instructions     You may go home after the procedure is completed. There may be some pain in your groin for 3 or 4 days after the operation. Some blood or yellow liquid may ooze from the cuts on the outside. The area around the cuts may swell and bruise. The sutures will dissolve on their own and do not require removal by the physician.    The first 48 hours after the procedure are crucial to healing. Generally, you may feel very good the day after the procedure, but that does not mean it is time to go back to normal activities. Resuming normal activities too soon is likely to cause internal bleeding and lots of pain.      Your provider may advise the following ways to care for yourself after the procedure:    Put an ice bag or package of frozen peas covered by a thin towel over the scrotum after the procedure. Leave the ice on the area for 30 minutes and then take it off for 30 minutes. Do this off and on for at least 24 hours.      Avoid all heavy lifting (greater than 10 pounds) for at least one week.    Wear a jockstrap or tight-fitting underwear for approximately one week to support the scrotum (testicles) and help reduce discomfort.    Take a pain reliever, such as Acetaminophen (Tylenol) or Ibuprofen (Advil), for any pain after the procedure. Your provider may prescribe a stronger pain medicine if it is needed.    You should be able to return to work in 48 hours, but strenuous activity should be avoided for two weeks.    Do not submerge the incision for 1 week following the procedure.    Ejaculation should be avoided for one week to allow the area to heal.    Follow-Up    You will need to have a negative post vasectomy analyses (no sperm seen) before you can discontinue birth control.    Semen Analysis   Schedule the post-vasectomy semen analysis three months after your vasectomy. You will need to ejaculate at least 20 times between your surgery and the first sample. Clinic staff will  contact your regarding your results via phone.    You will be given a container after the procedure. Collect the specimen at home by masturbation only (no lubrication, powders, saliva, or intercourse can be used) and bring it to the laboratory. You must have an appointment to drop off your specimen. The specimen needs to be delivered to the lab within 30-45 minutes.    Call [phone] with questions. For concerns or questions after hours or on weekends, please page the Urology Resident on-call: [phone].

## 2023-05-02 NOTE — PROGRESS NOTES
OFFICE VASECTOMY OPERATIVE NOTE  [facility]     DATE: 05/02/23  PATIENT: Lefty Chua    YOB: 1990    Lefty Chua is a 33 year old male.  He has 2 children and he wishes a vasectomy for birth control.  He has read the brochure and he has shaved himself.  I reviewed the vasectomy procedure with him explaining that it would be done with a local anesthetic given just in the location where the vasectomy would be done.  It would be done through incisions with the removal of segments of the vasa, cauterization of the ends, and burying the ends separate with sutures.      Pt. Understands:  -1/1000-1/3000 risk of future pregnancy even with perfectly done vasectomy  -vasectomy is a permanent procedure    -he may cryopreserve sperm if he wishes   -1-5% risk of post-vasectomy pain syndrome   -1-5% risk of complication, primarily infection or bleeding  - he needs to have a semen sample that shows no sperm before getting approval for unprotected intercourse.      Complications such as bleeding, infection, and damage to other tissues in the area were discussed. I recommended that an ice bag be placed on the scrotum off and on tonight to help reduce pain and swelling.      He was reminded that he was not sterile immediately after the vasectomy that it would take at least 20 ejaculations to empty the vas of any remaining sperm.  He was not to provide a semen sample until after the 20th ejaculation and not before 12 weeks after the vas. He was  to fulfill both of those requirements.   He understands it is his responsibility to find out the results of the vas before proceeding with intercourse without birth control protection.  Other items discussed were activity afterwards, returning to work, voluntary physical activity,  resuming sexual activity, clothing to wear, bathing, and care of the vas site and expected changes in the site as healing progresses.  After signing the permit, bilateral vasectomy was done as  described below.     ANESTHESIA: Local    DETAILS OF PROCEDURE: The risks of the procedure were explained in detail to the patient and informed consent was obtained. The patient was placed supine on the procedure table and the penis and scrotum were prepped and draped in the standard sterile fashion. The right vas deferens was isolated and brought up to the skin. 1% lidocaine local anesthesia was used to infiltrate the skin and the spermatic cord. A small incision was created and adventitial tissues were swept away from the vas. A 1 cm segment of the vas was excised and sent for pathology. The proximal and distal lumina of the vas were cauterized and then each segment was tied off in a knuckling-fashion with a 3-0 vicryl suture. Hemostasis was ensured and the segments were released back into the scrotum. Meticulous hemostasis was achieved. The skin was closed with 3-0 chromic suture in a horizontal mattress fashion. Next the left vas was brought to the skin and a vasectomy was performed in the similar fashion. The skin was closed with 3-0 chromic suture in a horizontal mattress fashion.    COMPLICATIONS: None    TAKE HOME MEDICATIONS: Tylenol every 6 hours, PRN    DISMISSAL INSTRUCTIONS:  - Ice pack to scrotum 15 to 20 minutes each hour awake for 36 to 40 hours.  - No strenuous activity or ejaculation for at least 7 days.  - No unprotected sexual activity until proven azoospermia on semen samples at 3 months.  - Referred to patient handout for normal postop expectations and indications to contact nurse or physician.    M.D.: Jr Greene MD

## 2023-07-10 ENCOUNTER — LAB (OUTPATIENT)
Dept: LAB | Facility: CLINIC | Age: 33
End: 2023-07-10
Payer: COMMERCIAL

## 2023-07-10 DIAGNOSIS — Z30.2 ENCOUNTER FOR STERILIZATION: ICD-10-CM

## 2023-07-10 LAB — SEMEN ANALYSIS P VAS PNL: NORMAL

## 2023-07-10 PROCEDURE — 89321 SEMEN ANAL SPERM DETECTION: CPT

## 2023-11-09 ENCOUNTER — PATIENT OUTREACH (OUTPATIENT)
Dept: CARE COORDINATION | Facility: CLINIC | Age: 33
End: 2023-11-09
Payer: COMMERCIAL

## 2023-11-30 ASSESSMENT — ENCOUNTER SYMPTOMS
MYALGIAS: 0
PARESTHESIAS: 0
ARTHRALGIAS: 0
JOINT SWELLING: 0
ABDOMINAL PAIN: 0
DYSURIA: 0
CHILLS: 0
DIZZINESS: 0
WEAKNESS: 0
SHORTNESS OF BREATH: 0
HEARTBURN: 0
SORE THROAT: 0
FREQUENCY: 0
DIARRHEA: 0
FEVER: 0
NAUSEA: 0
CONSTIPATION: 0
COUGH: 0
EYE PAIN: 0
HEMATOCHEZIA: 0
NERVOUS/ANXIOUS: 0
HEADACHES: 0
PALPITATIONS: 0
HEMATURIA: 0

## 2023-12-07 ENCOUNTER — OFFICE VISIT (OUTPATIENT)
Dept: FAMILY MEDICINE | Facility: CLINIC | Age: 33
End: 2023-12-07
Payer: COMMERCIAL

## 2023-12-07 VITALS
TEMPERATURE: 97.3 F | RESPIRATION RATE: 10 BRPM | HEART RATE: 61 BPM | DIASTOLIC BLOOD PRESSURE: 85 MMHG | BODY MASS INDEX: 23.41 KG/M2 | OXYGEN SATURATION: 99 % | WEIGHT: 163.5 LBS | SYSTOLIC BLOOD PRESSURE: 130 MMHG | HEIGHT: 70 IN

## 2023-12-07 DIAGNOSIS — Z13.21 ENCOUNTER FOR VITAMIN DEFICIENCY SCREENING: ICD-10-CM

## 2023-12-07 DIAGNOSIS — R53.83 DECREASED ENERGY: ICD-10-CM

## 2023-12-07 DIAGNOSIS — Z13.220 SCREENING FOR LIPID DISORDERS: ICD-10-CM

## 2023-12-07 DIAGNOSIS — Z00.00 ROUTINE GENERAL MEDICAL EXAMINATION AT A HEALTH CARE FACILITY: Primary | ICD-10-CM

## 2023-12-07 LAB
BASOPHILS # BLD AUTO: 0 10E3/UL (ref 0–0.2)
BASOPHILS NFR BLD AUTO: 1 %
EOSINOPHIL # BLD AUTO: 0.3 10E3/UL (ref 0–0.7)
EOSINOPHIL NFR BLD AUTO: 4 %
ERYTHROCYTE [DISTWIDTH] IN BLOOD BY AUTOMATED COUNT: 12 % (ref 10–15)
HCT VFR BLD AUTO: 41.7 % (ref 40–53)
HGB BLD-MCNC: 13.9 G/DL (ref 13.3–17.7)
IMM GRANULOCYTES # BLD: 0 10E3/UL
IMM GRANULOCYTES NFR BLD: 0 %
LYMPHOCYTES # BLD AUTO: 2.4 10E3/UL (ref 0.8–5.3)
LYMPHOCYTES NFR BLD AUTO: 32 %
MCH RBC QN AUTO: 29.6 PG (ref 26.5–33)
MCHC RBC AUTO-ENTMCNC: 33.3 G/DL (ref 31.5–36.5)
MCV RBC AUTO: 89 FL (ref 78–100)
MONOCYTES # BLD AUTO: 0.8 10E3/UL (ref 0–1.3)
MONOCYTES NFR BLD AUTO: 11 %
NEUTROPHILS # BLD AUTO: 3.9 10E3/UL (ref 1.6–8.3)
NEUTROPHILS NFR BLD AUTO: 53 %
PLATELET # BLD AUTO: 331 10E3/UL (ref 150–450)
RBC # BLD AUTO: 4.7 10E6/UL (ref 4.4–5.9)
WBC # BLD AUTO: 7.5 10E3/UL (ref 4–11)

## 2023-12-07 PROCEDURE — 36415 COLL VENOUS BLD VENIPUNCTURE: CPT | Performed by: INTERNAL MEDICINE

## 2023-12-07 PROCEDURE — 82306 VITAMIN D 25 HYDROXY: CPT | Performed by: INTERNAL MEDICINE

## 2023-12-07 PROCEDURE — 82607 VITAMIN B-12: CPT | Performed by: INTERNAL MEDICINE

## 2023-12-07 PROCEDURE — 80061 LIPID PANEL: CPT | Performed by: INTERNAL MEDICINE

## 2023-12-07 PROCEDURE — 99213 OFFICE O/P EST LOW 20 MIN: CPT | Mod: 25 | Performed by: INTERNAL MEDICINE

## 2023-12-07 PROCEDURE — 80053 COMPREHEN METABOLIC PANEL: CPT | Performed by: INTERNAL MEDICINE

## 2023-12-07 PROCEDURE — 85025 COMPLETE CBC W/AUTO DIFF WBC: CPT | Performed by: INTERNAL MEDICINE

## 2023-12-07 PROCEDURE — 99395 PREV VISIT EST AGE 18-39: CPT | Performed by: INTERNAL MEDICINE

## 2023-12-07 ASSESSMENT — ENCOUNTER SYMPTOMS
HEMATURIA: 0
FREQUENCY: 0
HEMATOCHEZIA: 0
SORE THROAT: 0
HEARTBURN: 0
MYALGIAS: 0
FEVER: 0
HEADACHES: 0
DIARRHEA: 0
PARESTHESIAS: 0
NERVOUS/ANXIOUS: 0
PALPITATIONS: 0
NAUSEA: 0
ABDOMINAL PAIN: 0
WEAKNESS: 0
CONSTIPATION: 0
COUGH: 0
DYSURIA: 0
SHORTNESS OF BREATH: 0
ARTHRALGIAS: 0
CHILLS: 0
JOINT SWELLING: 0
EYE PAIN: 0
DIZZINESS: 0

## 2023-12-07 ASSESSMENT — PAIN SCALES - GENERAL: PAINLEVEL: NO PAIN (0)

## 2023-12-07 NOTE — PROGRESS NOTES
"SUBJECTIVE:   Lefty is a 33 year old, presenting for the following:  No chief complaint on file.    33-year-old gentleman comes in for annual physical examination.  He is doing well overall.  He does not smoke or abuse alcohol.  He exercises regularly.    In May he had vasectomy performed.  He started noticing lack of energy and decrease the libido in September.  The symptoms have persisted.  He denies been depressed or anxious.  For work he travels a lot but nothing has changed at work.  He has 2 young children 1-year-old and 3-year-old at home.  His wife is taking care of the children at home.  Does not have any financial or relationship stressors.        2023     4:38 PM   Additional Questions   Roomed by Shira   Accompanied by self       Healthy Habits:     Getting at least 3 servings of Calcium per day:  Yes    Bi-annual eye exam:  Yes    Dental care twice a year:  Yes    Sleep apnea or symptoms of sleep apnea:  Daytime drowsiness    Diet:  Regular (no restrictions)    Frequency of exercise:  4-5 days/week    Duration of exercise:  45-60 minutes    Taking medications regularly:  Yes    Medication side effects:  Not applicable    Additional concerns today:  Yes        Social History     Tobacco Use    Smoking status: Former     Packs/day: 1     Types: Cigarettes     Start date:      Quit date: 3/19/2018     Years since quittin.7    Smokeless tobacco: Never   Substance Use Topics    Alcohol use: Yes     Comment: minimal             2023     9:31 AM   Alcohol Use   Prescreen: >3 drinks/day or >7 drinks/week? No       Last PSA: No results found for: \"PSA\"    Reviewed orders with patient. Reviewed health maintenance and updated orders accordingly - Yes  BP Readings from Last 3 Encounters:   23 130/85   23 120/69   22 118/68    Wt Readings from Last 3 Encounters:   23 74.2 kg (163 lb 8 oz)   22 75.3 kg (166 lb)   21 76 kg (167 lb 8 oz)                  Patient " Active Problem List   Diagnosis    Intrinsic eczema     Past Surgical History:   Procedure Laterality Date    TONSILLECTOMY  1998       Social History     Tobacco Use    Smoking status: Former     Packs/day: 1.00     Years: 10.00     Additional pack years: 0.00     Total pack years: 10.00     Types: Cigarettes     Start date: 2005     Quit date: 3/19/2018     Years since quittin.7    Smokeless tobacco: Never   Substance Use Topics    Alcohol use: Yes     Comment: minimal     Family History   Problem Relation Age of Onset    Alcoholism Father     Diabetes Maternal Grandfather     Alcoholism Maternal Grandfather          Current Outpatient Medications   Medication Sig Dispense Refill    betamethasone dipropionate (DIPROSONE) 0.05 % external lotion Apply topically 2 times daily (Patient not taking: Reported on 2023) 30 mL 1     No Known Allergies    Reviewed and updated as needed this visit by clinical staff                  Reviewed and updated as needed this visit by Provider                 Past Medical History:   Diagnosis Date    History of closed shoulder dislocation 2021    Left  - posterior x2    Intrinsic eczema 2022      Past Surgical History:   Procedure Laterality Date    TONSILLECTOMY         Review of Systems   Constitutional:  Negative for chills and fever.   HENT:  Negative for congestion, ear pain, hearing loss and sore throat.    Eyes:  Negative for pain and visual disturbance.   Respiratory:  Negative for cough and shortness of breath.    Cardiovascular:  Negative for chest pain, palpitations and peripheral edema.   Gastrointestinal:  Negative for abdominal pain, constipation, diarrhea, heartburn, hematochezia and nausea.   Genitourinary:  Negative for dysuria, frequency, genital sores, hematuria, impotence, penile discharge and urgency.   Musculoskeletal:  Negative for arthralgias, joint swelling and myalgias.   Skin:  Negative for rash.   Neurological:  Negative for  dizziness, weakness, headaches and paresthesias.   Psychiatric/Behavioral:  Negative for mood changes. The patient is not nervous/anxious.          OBJECTIVE:   There were no vitals taken for this visit.    Physical Exam  GENERAL: healthy, alert and no distress  EYES: Eyes grossly normal to inspection, PERRL and conjunctivae and sclerae normal  HENT: ear canals and TM's normal, nose and mouth without ulcers or lesions  NECK: no adenopathy, no asymmetry, masses, or scars and thyroid normal to palpation  RESP: lungs clear to auscultation - no rales, rhonchi or wheezes  CV: regular rate and rhythm, normal S1 S2, no S3 or S4, no murmur, click or rub, no peripheral edema and peripheral pulses strong  ABDOMEN: soft, nontender, no hepatosplenomegaly, no masses and bowel sounds normal   (male): normal male genitalia without lesions or urethral discharge, no hernia  MS: no gross musculoskeletal defects noted, no edema  SKIN: no suspicious lesions or rashes  NEURO: Normal strength and tone, mentation intact and speech normal  PSYCH: mentation appears normal, affect normal/bright  LYMPH: no cervical, supraclavicular, axillary, or inguinal adenopathy        ASSESSMENT/PLAN:     1.  Routine general physical exam completed and is excellent.  2.  Decreased energy and slightly decreased libido past 3 months.  Will investigate further by checking CBC CMP lipids B12 and vitamin D level.  3.  Screening for lipid disorder.  A nonfasting lipids will be checked today.  4.  Encounter for vitamin deficiency screening.  I will check vitamin D level.    I will get back to him with the results.     Patient has been advised of split billing requirements and indicates understanding: Yes      COUNSELING:   Reviewed preventive health counseling, as reflected in patient instructions       Regular exercise       Healthy diet/nutrition       Vision screening        He reports that he quit smoking about 5 years ago. His smoking use included  cigarettes. He started smoking about 18 years ago. He smoked an average of 1 pack per day. He has never used smokeless tobacco.            Elian Phillips MD  [facility]

## 2023-12-08 LAB
ALBUMIN SERPL BCG-MCNC: 4.9 G/DL (ref 3.5–5.2)
ALP SERPL-CCNC: 58 U/L (ref 40–150)
ALT SERPL W P-5'-P-CCNC: 17 U/L (ref 0–70)
ANION GAP SERPL CALCULATED.3IONS-SCNC: 14 MMOL/L (ref 7–15)
AST SERPL W P-5'-P-CCNC: 26 U/L (ref 0–45)
BILIRUB SERPL-MCNC: 0.2 MG/DL
BUN SERPL-MCNC: 14 MG/DL (ref 6–20)
CALCIUM SERPL-MCNC: 9.5 MG/DL (ref 8.6–10)
CHLORIDE SERPL-SCNC: 101 MMOL/L (ref 98–107)
CHOLEST SERPL-MCNC: 217 MG/DL
CREAT SERPL-MCNC: 0.99 MG/DL (ref 0.67–1.17)
DEPRECATED HCO3 PLAS-SCNC: 24 MMOL/L (ref 22–29)
EGFRCR SERPLBLD CKD-EPI 2021: >90 ML/MIN/1.73M2
FASTING STATUS PATIENT QL REPORTED: NO
GLUCOSE SERPL-MCNC: 91 MG/DL (ref 70–99)
HDLC SERPL-MCNC: 49 MG/DL
LDLC SERPL CALC-MCNC: 146 MG/DL
NONHDLC SERPL-MCNC: 168 MG/DL
POTASSIUM SERPL-SCNC: 4.1 MMOL/L (ref 3.4–5.3)
PROT SERPL-MCNC: 7.4 G/DL (ref 6.4–8.3)
SODIUM SERPL-SCNC: 139 MMOL/L (ref 135–145)
TRIGL SERPL-MCNC: 112 MG/DL
VIT B12 SERPL-MCNC: 958 PG/ML (ref 232–1245)
VIT D+METAB SERPL-MCNC: 38 NG/ML (ref 20–50)

## 2024-11-07 ENCOUNTER — PATIENT OUTREACH (OUTPATIENT)
Dept: CARE COORDINATION | Facility: CLINIC | Age: 34
End: 2024-11-07
Payer: COMMERCIAL

## 2024-11-21 ENCOUNTER — PATIENT OUTREACH (OUTPATIENT)
Dept: CARE COORDINATION | Facility: CLINIC | Age: 34
End: 2024-11-21
Payer: COMMERCIAL

## 2025-01-12 ENCOUNTER — HEALTH MAINTENANCE LETTER (OUTPATIENT)
Age: 35
End: 2025-01-12

## 2025-06-05 ENCOUNTER — PATIENT OUTREACH (OUTPATIENT)
Dept: CARE COORDINATION | Facility: CLINIC | Age: 35
End: 2025-06-05
Payer: COMMERCIAL